# Patient Record
Sex: FEMALE | Race: WHITE
[De-identification: names, ages, dates, MRNs, and addresses within clinical notes are randomized per-mention and may not be internally consistent; named-entity substitution may affect disease eponyms.]

---

## 2018-03-21 ENCOUNTER — HOSPITAL ENCOUNTER (OUTPATIENT)
Dept: HOSPITAL 53 - M SMT | Age: 33
End: 2018-03-21
Attending: OBSTETRICS & GYNECOLOGY
Payer: COMMERCIAL

## 2018-03-21 DIAGNOSIS — Z3A.08: ICD-10-CM

## 2018-03-21 DIAGNOSIS — Z34.81: Primary | ICD-10-CM

## 2018-03-21 LAB
BASO #: 0.1 10^3/UL (ref 0–0.2)
BASO %: 0.5 % (ref 0–1)
CHLAMYDIA DNA AMPLIFICATION: NEGATIVE
EOS #: 0.3 10^3/UL (ref 0–0.5)
EOSINOPHIL NFR BLD AUTO: 3.7 % (ref 0–3)
GC DNA AMPLIFICATION: NEGATIVE
HEMATOCRIT: 43.5 % (ref 36–47)
HEMOGLOBIN: 14.4 G/DL (ref 12–16)
IMMATURE GRANULOCYTE %: 0.3 % (ref 0–3)
LYMPH #: 2.3 10^3/UL (ref 1.5–4.5)
LYMPH %: 25.5 % (ref 24–44)
MEAN CORPUSCULAR HEMOGLOBIN: 29.4 PG (ref 27–33)
MEAN CORPUSCULAR HGB CONC: 33.1 G/DL (ref 32–36.5)
MEAN CORPUSCULAR VOLUME: 89 FL (ref 80–96)
MONO #: 0.6 10^3/UL (ref 0–0.8)
MONO %: 6.6 % (ref 0–5)
NEUTROPHILS #: 5.8 10^3/UL (ref 1.8–7.7)
NEUTROPHILS %: 63.4 % (ref 36–66)
NRBC BLD AUTO-RTO: 0 % (ref 0–0)
PLATELET COUNT, AUTOMATED: 411 10^3/UL (ref 150–450)
RED BLOOD COUNT: 4.89 10^6/UL (ref 4–5.4)
RED CELL DISTRIBUTION WIDTH: 13.2 % (ref 11.5–14.5)
WHITE BLOOD COUNT: 9.1 10^3/UL (ref 4–10)

## 2018-03-23 LAB
HBSAG PRENATAL: NEGATIVE
HCV AB SER QL: 0.1 INDEX (ref ?–0.8)
HIV 1&2 SCREEN CENTAUR: NEGATIVE
RUBELLA IGG QUALITATIVE: (no result)
SYPHILIS: NONREACTIVE

## 2018-05-25 ENCOUNTER — HOSPITAL ENCOUNTER (OUTPATIENT)
Dept: HOSPITAL 53 - M LAB | Age: 33
End: 2018-05-25
Attending: OBSTETRICS & GYNECOLOGY
Payer: COMMERCIAL

## 2018-05-25 DIAGNOSIS — Z34.82: Primary | ICD-10-CM

## 2018-06-26 ENCOUNTER — HOSPITAL ENCOUNTER (OUTPATIENT)
Dept: HOSPITAL 53 - M RAD | Age: 33
End: 2018-06-26
Payer: COMMERCIAL

## 2018-06-26 DIAGNOSIS — O30.042: Primary | ICD-10-CM

## 2018-06-26 PROCEDURE — 76816 OB US FOLLOW-UP PER FETUS: CPT

## 2018-07-23 ENCOUNTER — HOSPITAL ENCOUNTER (OUTPATIENT)
Dept: HOSPITAL 53 - M LAB | Age: 33
End: 2018-07-23
Attending: OBSTETRICS & GYNECOLOGY
Payer: COMMERCIAL

## 2018-07-23 DIAGNOSIS — Z3A.00: ICD-10-CM

## 2018-07-23 DIAGNOSIS — Z36.89: Primary | ICD-10-CM

## 2018-07-23 DIAGNOSIS — O30.042: ICD-10-CM

## 2018-07-23 LAB
GLUCOSE CHALLENGE TEST 1 HOUR: 166 MG/DL (ref ?–140)
HEMATOCRIT: 36 % (ref 36–47)
HEMOGLOBIN: 12 G/DL (ref 12–15.5)
MEAN CORPUSCULAR HEMOGLOBIN: 31.9 PG (ref 27–33)
MEAN CORPUSCULAR HGB CONC: 33.3 G/DL (ref 32–36.5)
MEAN CORPUSCULAR VOLUME: 95.7 FL (ref 80–96)
NRBC BLD AUTO-RTO: 0 % (ref 0–0)
PLATELET COUNT, AUTOMATED: 244 10^3/UL (ref 150–450)
RED BLOOD COUNT: 3.76 10^6/UL (ref 4–5.4)
RED CELL DISTRIBUTION WIDTH: 13.4 % (ref 11.5–14.5)
WHITE BLOOD COUNT: 10 10^3/UL (ref 4–10)

## 2018-07-23 PROCEDURE — 82950 GLUCOSE TEST: CPT

## 2018-07-25 ENCOUNTER — HOSPITAL ENCOUNTER (OUTPATIENT)
Dept: HOSPITAL 53 - M RAD | Age: 33
End: 2018-07-25
Attending: OBSTETRICS & GYNECOLOGY
Payer: COMMERCIAL

## 2018-07-25 DIAGNOSIS — Z34.82: Primary | ICD-10-CM

## 2018-07-25 PROCEDURE — 76816 OB US FOLLOW-UP PER FETUS: CPT

## 2018-07-27 ENCOUNTER — HOSPITAL ENCOUNTER (OUTPATIENT)
Dept: HOSPITAL 53 - M LAB | Age: 33
End: 2018-07-27
Attending: OBSTETRICS & GYNECOLOGY
Payer: COMMERCIAL

## 2018-07-27 DIAGNOSIS — Z36.89: Primary | ICD-10-CM

## 2018-07-27 DIAGNOSIS — Z3A.00: ICD-10-CM

## 2018-07-27 DIAGNOSIS — O30.042: ICD-10-CM

## 2018-07-27 LAB
2 HR GLUCOSE: 141 MG/DL (ref ?–155)
3 HR GLUCOSE: 86 MG/DL (ref ?–140)
GLUCOSE 1H P 50 G GLC PO SERPL-MCNC: 141 MG/DL (ref ?–180)
GLUCOSE P FAST SERPL-MCNC: 86 MG/DL (ref ?–95)

## 2018-07-27 PROCEDURE — 82951 GLUCOSE TOLERANCE TEST (GTT): CPT

## 2018-08-24 ENCOUNTER — HOSPITAL ENCOUNTER (OUTPATIENT)
Dept: HOSPITAL 53 - M RAD | Age: 33
End: 2018-08-24
Attending: OBSTETRICS & GYNECOLOGY
Payer: COMMERCIAL

## 2018-08-24 DIAGNOSIS — O30.043: Primary | ICD-10-CM

## 2018-09-11 ENCOUNTER — HOSPITAL ENCOUNTER (OUTPATIENT)
Dept: HOSPITAL 53 - M RAD | Age: 33
End: 2018-09-11
Attending: OBSTETRICS & GYNECOLOGY
Payer: COMMERCIAL

## 2018-09-11 DIAGNOSIS — Z3A.33: ICD-10-CM

## 2018-09-11 DIAGNOSIS — O30.043: Primary | ICD-10-CM

## 2018-09-11 PROCEDURE — 76816 OB US FOLLOW-UP PER FETUS: CPT

## 2018-09-21 ENCOUNTER — HOSPITAL ENCOUNTER (OUTPATIENT)
Dept: HOSPITAL 53 - M LAB REF | Age: 33
End: 2018-09-21
Attending: ADVANCED PRACTICE MIDWIFE
Payer: COMMERCIAL

## 2018-09-21 DIAGNOSIS — Z34.83: Primary | ICD-10-CM

## 2018-10-01 ENCOUNTER — HOSPITAL ENCOUNTER (OUTPATIENT)
Dept: HOSPITAL 53 - M RAD | Age: 33
End: 2018-10-01
Attending: OBSTETRICS & GYNECOLOGY
Payer: COMMERCIAL

## 2018-10-01 DIAGNOSIS — O30.043: Primary | ICD-10-CM

## 2018-10-01 PROCEDURE — 76816 OB US FOLLOW-UP PER FETUS: CPT

## 2019-02-22 ENCOUNTER — HOSPITAL ENCOUNTER (OUTPATIENT)
Dept: HOSPITAL 53 - M LAB REF | Age: 34
End: 2019-02-22
Attending: ADVANCED PRACTICE MIDWIFE
Payer: COMMERCIAL

## 2019-02-22 DIAGNOSIS — Z11.51: ICD-10-CM

## 2019-02-22 DIAGNOSIS — Z12.4: Primary | ICD-10-CM

## 2019-02-22 PROCEDURE — 87624 HPV HI-RISK TYP POOLED RSLT: CPT

## 2019-02-25 LAB — HPV LOW VOL RFLX: (no result)

## 2019-03-07 ENCOUNTER — HOSPITAL ENCOUNTER (OUTPATIENT)
Dept: HOSPITAL 53 - M SFHCPLAZ | Age: 34
End: 2019-03-07
Attending: NURSE PRACTITIONER
Payer: COMMERCIAL

## 2019-03-07 DIAGNOSIS — M25.50: ICD-10-CM

## 2019-03-07 DIAGNOSIS — R60.9: ICD-10-CM

## 2019-03-07 DIAGNOSIS — Z00.00: Primary | ICD-10-CM

## 2019-03-07 DIAGNOSIS — Z53.9: ICD-10-CM

## 2019-03-09 ENCOUNTER — HOSPITAL ENCOUNTER (OUTPATIENT)
Dept: HOSPITAL 53 - M LAB | Age: 34
End: 2019-03-09
Attending: NURSE PRACTITIONER
Payer: COMMERCIAL

## 2019-03-09 DIAGNOSIS — M25.50: ICD-10-CM

## 2019-03-09 DIAGNOSIS — R60.9: ICD-10-CM

## 2019-03-09 DIAGNOSIS — Z00.00: Primary | ICD-10-CM

## 2019-03-09 LAB
ALBUMIN SERPL BCG-MCNC: 3.7 GM/DL (ref 3.2–5.2)
ALT SERPL W P-5'-P-CCNC: 25 U/L (ref 12–78)
BILIRUB SERPL-MCNC: 0.3 MG/DL (ref 0.2–1)
BUN SERPL-MCNC: 11 MG/DL (ref 7–18)
CALCIUM SERPL-MCNC: 9.2 MG/DL (ref 8.5–10.1)
CHLORIDE SERPL-SCNC: 108 MEQ/L (ref 98–107)
CO2 SERPL-SCNC: 27 MEQ/L (ref 21–32)
CREAT SERPL-MCNC: 0.69 MG/DL (ref 0.55–1.3)
ERYTHROCYTE [SEDIMENTATION RATE] IN BLOOD BY WESTERGREN METHOD: 8 MM/HR (ref 0–20)
GFR SERPL CREATININE-BSD FRML MDRD: > 60 ML/MIN/{1.73_M2} (ref 60–?)
GLUCOSE SERPL-MCNC: 77 MG/DL (ref 70–100)
HCT VFR BLD AUTO: 44.8 % (ref 36–47)
HGB BLD-MCNC: 14.8 G/DL (ref 12–15.5)
MCH RBC QN AUTO: 30 PG (ref 27–33)
MCHC RBC AUTO-ENTMCNC: 33 G/DL (ref 32–36.5)
MCV RBC AUTO: 90.9 FL (ref 80–96)
PLATELET # BLD AUTO: 320 10^3/UL (ref 150–450)
POTASSIUM SERPL-SCNC: 4.4 MEQ/L (ref 3.5–5.1)
PROT SERPL-MCNC: 6.8 GM/DL (ref 6.4–8.2)
RBC # BLD AUTO: 4.93 10^6/UL (ref 4–5.4)
RHEUMATOID FACT SERPL-ACNC: 30.2 IU/ML (ref ?–15)
SODIUM SERPL-SCNC: 143 MEQ/L (ref 136–145)
TSH SERPL DL<=0.005 MIU/L-ACNC: 1.39 UIU/ML (ref 0.36–3.74)
WBC # BLD AUTO: 5.4 10^3/UL (ref 4–10)

## 2019-03-25 ENCOUNTER — HOSPITAL ENCOUNTER (OUTPATIENT)
Dept: HOSPITAL 53 - M RAD | Age: 34
End: 2019-03-25
Attending: NURSE PRACTITIONER
Payer: COMMERCIAL

## 2019-03-25 DIAGNOSIS — R76.8: ICD-10-CM

## 2019-03-25 DIAGNOSIS — M79.89: Primary | ICD-10-CM

## 2019-03-25 NOTE — REP
Clinical:  Bilateral hand swelling.

 

Technique:  AP and lateral views of the right and left hand.

 

Findings:

Osseous structures, joint spaces, and surrounding soft tissues are symmetric and

normal for age.  No evidence for acute injury.  No subcutaneous emphysema or

radiodense foreign body.  No overt arthritic degenerative changes.

 

Impression:

Normal bilateral hand radiographs.

 

 

Electronically Signed by

Mahesh Ferguson MD 03/25/2019 06:47 P

## 2019-08-07 ENCOUNTER — HOSPITAL ENCOUNTER (OUTPATIENT)
Dept: HOSPITAL 53 - M LAB | Age: 34
End: 2019-08-07
Attending: NURSE PRACTITIONER
Payer: COMMERCIAL

## 2019-08-07 DIAGNOSIS — R76.8: Primary | ICD-10-CM

## 2021-07-12 ENCOUNTER — HOSPITAL ENCOUNTER (OUTPATIENT)
Dept: HOSPITAL 53 - M SFHCWAGY | Age: 36
End: 2021-07-12
Attending: OBSTETRICS & GYNECOLOGY
Payer: COMMERCIAL

## 2021-07-12 DIAGNOSIS — Z01.419: Primary | ICD-10-CM

## 2021-07-12 DIAGNOSIS — Z12.4: ICD-10-CM

## 2021-07-12 DIAGNOSIS — Z77.9: ICD-10-CM
